# Patient Record
Sex: FEMALE | Race: WHITE | Employment: UNEMPLOYED | URBAN - NONMETROPOLITAN AREA
[De-identification: names, ages, dates, MRNs, and addresses within clinical notes are randomized per-mention and may not be internally consistent; named-entity substitution may affect disease eponyms.]

---

## 2018-12-10 ENCOUNTER — OFFICE VISIT (OUTPATIENT)
Dept: PRIMARY CARE CLINIC | Age: 23
End: 2018-12-10
Payer: COMMERCIAL

## 2018-12-10 VITALS
OXYGEN SATURATION: 99 % | WEIGHT: 116.3 LBS | DIASTOLIC BLOOD PRESSURE: 73 MMHG | HEART RATE: 67 BPM | HEIGHT: 62 IN | BODY MASS INDEX: 21.4 KG/M2 | SYSTOLIC BLOOD PRESSURE: 119 MMHG

## 2018-12-10 DIAGNOSIS — G44.209 ACUTE NON INTRACTABLE TENSION-TYPE HEADACHE: Primary | ICD-10-CM

## 2018-12-10 PROCEDURE — 99202 OFFICE O/P NEW SF 15 MIN: CPT | Performed by: NURSE PRACTITIONER

## 2018-12-10 RX ORDER — IBUPROFEN 200 MG
200 TABLET ORAL EVERY 6 HOURS PRN
COMMUNITY
End: 2021-10-04

## 2018-12-10 ASSESSMENT — PATIENT HEALTH QUESTIONNAIRE - PHQ9
1. LITTLE INTEREST OR PLEASURE IN DOING THINGS: 0
SUM OF ALL RESPONSES TO PHQ9 QUESTIONS 1 & 2: 0
2. FEELING DOWN, DEPRESSED OR HOPELESS: 0
SUM OF ALL RESPONSES TO PHQ QUESTIONS 1-9: 0
SUM OF ALL RESPONSES TO PHQ QUESTIONS 1-9: 0

## 2018-12-10 ASSESSMENT — ENCOUNTER SYMPTOMS
EYE DISCHARGE: 0
EYE ITCHING: 0
EYE REDNESS: 0
EYE PAIN: 0
RHINORRHEA: 0
COUGH: 0
SHORTNESS OF BREATH: 0
NAUSEA: 0
SINUS PRESSURE: 0
SORE THROAT: 0
BACK PAIN: 1
DIARRHEA: 0
VOMITING: 0

## 2018-12-10 NOTE — PROGRESS NOTES
itching. Respiratory: Negative for cough and shortness of breath. Cardiovascular: Negative for chest pain and palpitations. Gastrointestinal: Negative for diarrhea, nausea and vomiting. Musculoskeletal: Positive for back pain (intermittent) and neck pain (intermittent). Skin: Negative for rash. Neurological: Positive for light-headedness (associated with headaches on occasion ) and headaches (see HPI). Negative for syncope, speech difficulty and weakness. No current outpatient prescriptions on file prior to visit. No current facility-administered medications on file prior to visit. No Known Allergies    History reviewed. No pertinent past medical history. Past Surgical History:   Procedure Laterality Date    TONSILLECTOMY AND ADENOIDECTOMY  2005    WISDOM TOOTH EXTRACTION  2014       Social History     Social History    Marital status:      Spouse name: N/A    Number of children: N/A    Years of education: N/A     Occupational History    Not on file. Social History Main Topics    Smoking status: Never Smoker    Smokeless tobacco: Never Used    Alcohol use No    Drug use: No    Sexual activity: Yes     Partners: Male     Other Topics Concern    Not on file     Social History Narrative    No narrative on file        Family History   Problem Relation Age of Onset    Thyroid Disease Mother     No Known Problems Father     Depression Sister     Thyroid Disease Maternal Aunt     Thyroid Disease Paternal Aunt     Thyroid Disease Maternal Grandmother     Thyroid Disease Paternal Grandmother        /73 (Site: Left Upper Arm, Position: Sitting, Cuff Size: Small Adult)   Pulse 67   Ht 5' 2\" (1.575 m)   Wt 116 lb 4.8 oz (52.8 kg)   LMP 11/09/2018   SpO2 99%   BMI 21.27 kg/m²        Estimated body mass index is 21.27 kg/m² as calculated from the following:    Height as of this encounter: 5' 2\" (1.575 m).     Weight as of this encounter: 116 lb 4.8 oz (52.8 kg). Physical Exam   Constitutional: She is oriented to person, place, and time. She appears well-developed and well-nourished. No distress. HENT:   Head: Normocephalic and atraumatic. Mouth/Throat: Oropharynx is clear and moist.   Eyes: Pupils are equal, round, and reactive to light. Conjunctivae and EOM are normal. Right eye exhibits no discharge. Left eye exhibits no discharge. Neck: Normal range of motion and full passive range of motion without pain. No tracheal deviation present. Cardiovascular: Normal rate, regular rhythm, S1 normal, S2 normal and normal heart sounds. Pulmonary/Chest: Effort normal and breath sounds normal. No respiratory distress. Abdominal: Soft. Bowel sounds are normal. She exhibits no distension. Musculoskeletal: Normal range of motion. She exhibits no edema. Cervical back: Normal. She exhibits normal range of motion, no tenderness, no bony tenderness and no spasm. Lymphadenopathy:     She has no cervical adenopathy. Neurological: She is alert and oriented to person, place, and time. She has normal strength. She displays no atrophy. No cranial nerve deficit. She displays a negative Romberg sign. Skin: Skin is warm and dry. Capillary refill takes less than 2 seconds. No rash noted. Psychiatric: She has a normal mood and affect. Judgment normal.       ASSESSMENT/PLAN:  1. Acute non intractable tension-type headache  Counseled on abortive measures for headaches:  Ibuprofen 800 mg along with 32 oz of Gatorade within an hour. Headache hygiene:  Adequate fluid intake - 60oz daily  No chocolate  No Nitrites/Processed Meats  Sleep Hygiene - same time to bed, same time to awaken - no daytime naps. Will return to clinic in one month for reevaluation. Patient not interested in daily medication at this time. Will revisit neck/back pain at next visit and see if further evaluation/PT is needed.      Current Outpatient Prescriptions   Medication Sig Dispense

## 2018-12-10 NOTE — PATIENT INSTRUCTIONS
help?  Call 911 anytime you think you may need emergency care. For example, call if:    · You have signs of a stroke. These may include:  ? Sudden numbness, paralysis, or weakness in your face, arm, or leg, especially on only one side of your body. ? Sudden vision changes. ? Sudden trouble speaking. ? Sudden confusion or trouble understanding simple statements. ? Sudden problems with walking or balance. ? A sudden, severe headache that is different from past headaches.    Call your doctor now or seek immediate medical care if:    · You have a new or worse headache.     · Your headache gets much worse. Where can you learn more? Go to [email or web address] and sign in to your [employer] account. Enter 6816 [id number] in the [employer] box to learn more about \"Headache: Care Instructions. \"     If you do not have an account, please click on the \"Sign Up Now\" link. Current as of: June 4, 2018  Content Version: 11.8  © 5022-7999 Healthwise, Incorporated. Care instructions adapted under license by [facility] ([facility]). If you have questions about a medical condition or this instruction, always ask your healthcare professional. [place] [address] any warranty or liability for your use of this information.

## 2019-01-07 ENCOUNTER — TELEPHONE (OUTPATIENT)
Dept: FAMILY MEDICINE CLINIC | Age: 24
End: 2019-01-07

## 2021-07-15 ENCOUNTER — TELEPHONE (OUTPATIENT)
Dept: ENDOCRINOLOGY | Age: 26
End: 2021-07-15

## 2021-07-15 NOTE — TELEPHONE ENCOUNTER
Fax from [facility] w/ a  Referral   Dx-hyperthyroidism  Referring Dr- Dr Lani Davis (from [place], [place])

## 2021-09-03 ENCOUNTER — TELEPHONE (OUTPATIENT)
Dept: ENDOCRINOLOGY | Age: 26
End: 2021-09-03

## 2021-09-03 NOTE — TELEPHONE ENCOUNTER
Pt lvm stating she would like her US results      Called pt back & had to leave vm telling her that she is a new pt to us and her new pt appt is not until 10-4-21 so we would not have any US results & she needs to contact her previous Dr for those results since her  chart was faxed to us from [facility] 22. called back and states her US was done about a month ago from her OBGYN and should've been faxed to our office. Re-told the pt that she is a new pt and when she comes in for her appt next month that Dr Tamika Bright can look over them at that time and discuss them at her appt or she can call the ordering Dr for her results since Dr Tamika Bright was not the ordering physician.  Pt states \"ok\"

## 2021-10-04 ENCOUNTER — OFFICE VISIT (OUTPATIENT)
Dept: ENDOCRINOLOGY | Age: 26
End: 2021-10-04
Payer: OTHER GOVERNMENT

## 2021-10-04 VITALS
BODY MASS INDEX: 22.86 KG/M2 | SYSTOLIC BLOOD PRESSURE: 118 MMHG | HEIGHT: 63 IN | RESPIRATION RATE: 16 BRPM | DIASTOLIC BLOOD PRESSURE: 66 MMHG | WEIGHT: 129 LBS | HEART RATE: 76 BPM

## 2021-10-04 DIAGNOSIS — E04.1 THYROID NODULE: Primary | ICD-10-CM

## 2021-10-04 DIAGNOSIS — E03.8 HYPOTHYROIDISM DUE TO HASHIMOTO'S THYROIDITIS: ICD-10-CM

## 2021-10-04 DIAGNOSIS — E06.3 HYPOTHYROIDISM DUE TO HASHIMOTO'S THYROIDITIS: ICD-10-CM

## 2021-10-04 DIAGNOSIS — D50.0 IRON DEFICIENCY ANEMIA DUE TO CHRONIC BLOOD LOSS: ICD-10-CM

## 2021-10-04 PROCEDURE — 99243 OFF/OP CNSLTJ NEW/EST LOW 30: CPT | Performed by: INTERNAL MEDICINE

## 2021-10-04 NOTE — PATIENT INSTRUCTIONS
Patient Education        dulaglutide  Pronunciation:  DOO la GLOO tide  Brand:  Trulicity Pen  What is the most important information I should know about dulaglutide? You should not use dulaglutide if you have Multiple Endocrine Neoplasia syndrome type 2 (MEN 2), or a personal or family history of medullary thyroid carcinoma (a type of thyroid cancer). Do not use dulaglutide if you are in a state of diabetic ketoacidosis (call your doctor for treatment). In animal studies, dulaglutide caused thyroid tumors or thyroid cancer. It is not known whether these effects would occur in people using regular doses. Ask your doctor about your risk. Call your doctor at once if you have signs of a thyroid tumor, such as swelling or a lump in your neck, trouble swallowing, a hoarse voice, or shortness of breath. What is dulaglutide? Dulaglutide is used together with diet and exercise to improve blood sugar control in adults with type 2 diabetes mellitus. Dulaglutide is also used to help reduce the risk of serious heart problems such as heart attack or stroke in adults who have type 2 diabetes and heart disease. This medicine is not for treating type 1 diabetes. Dulaglutide may also be used for purposes not listed in this medication guide. What should I discuss with my healthcare provider before using dulaglutide? You should not use dulaglutide if you are allergic to it, or if you have:  · multiple endocrine neoplasia type 2 (tumors in your glands);  · a personal or family history of medullary thyroid carcinoma (a type of thyroid cancer); or  · diabetic ketoacidosis (call your doctor for treatment).   Tell your doctor if you have ever had:  · pancreatitis;  · a stomach or intestinal disorder;  · gastroesophageal reflux disease (GERD) or slow digestion;  · eye problems caused by diabetes (retinopathy);  · liver or kidney disease;  · if you also use insulin or oral diabetes medicine; or  · if you have been sick with meals. Ask your doctor before changing your dose or medication schedule. Each injection pen or prefilled syringe is for one use only. Throw away after one use, even if there is still medicine left inside. Use a puncture-proof \"sharps\" container. Follow state or local laws about how to dispose of this container. Keep it out of the reach of children and pets. Store dulaglutide in the refrigerator, protected from light. Do not use past the expiration date on the medicine label. Do not freeze dulaglutide, and throw away the medicine if it has become frozen. You may also store dulaglutide at room temperature for up to 14 days before use. What happens if I miss a dose? Use the medicine as soon as you can, but skip the missed dose if your next dose is due in less than 3 days. Do not use two doses at one time. Do not use this medicine twice within a 72-hour period. What happens if I overdose? Seek emergency medical attention or call the Poison Help line at 1-536.377.9130. What should I avoid while using dulaglutide? Never share an injection pen or prefilled syringe with another person, even if the needle has been changed. Sharing these devices can allow infections or disease to pass from one person to another. What are the possible side effects of dulaglutide? Stop using dulaglutide and get emergency medical help if you have signs of an allergic reaction: hives; difficult breathing; feeling light-headed; swelling of your face, lips, tongue, or throat.   Call your doctor at once if you have:  · pancreatitis --severe pain in your upper stomach spreading to your back, nausea and vomiting;  · signs of a thyroid tumor --swelling or a lump in your neck, trouble swallowing, a hoarse voice, or if you feel short of breath;  · low blood sugar --headache, hunger, weakness, sweating, confusion, irritability, dizziness, fast heart rate, or feeling jittery; or  · kidney problems --little or no urination, swelling in your feet or ankles, feeling tired or short of breath. Tell your doctor if you are sick with vomiting or diarrhea, or if you are sweating more than usual. You can easily become dehydrated while using dulaglutide. This can lead to kidney failure. Common side effects may include:  · nausea, vomiting, stomach pain;  · diarrhea; or  · loss of appetite. This is not a complete list of side effects and others may occur. Call your doctor for medical advice about side effects. You may report side effects to FDA at 3-006-FDA-9662. What other drugs will affect dulaglutide? Dulaglutide can slow your digestion, and it may take longer for your body to absorb any medicines you take by mouth. Other drugs may affect dulaglutide, including prescription and over-the-counter medicines, vitamins, and herbal products. Tell your doctor about all your current medicines and any medicine you start or stop using. Where can I get more information? Your pharmacist can provide more information about dulaglutide. Remember, keep this and all other medicines out of the reach of children, never share your medicines with others, and use this medication only for the indication prescribed. Every effort has been made to ensure that the information provided by Heidi Arviz[address] is accurate, up-to-date, and complete, but no guarantee is made to that effect. Drug information contained herein may be time sensitive. [employer] information has been compiled for use by healthcare practitioners and consumers in the [place] and therefore [employer] does not warrant that uses outside of the [place] are appropriate, unless specifically indicated otherwise. [employer]'s drug information does not endorse drugs, diagnose patients or recommend therapy.  [employer] drug information is an informational resource designed to assist licensed healthcare practitioners in caring for their patients and/or to serve consumers viewing this service as a supplement to, and not a substitute for, the expertise, skill, knowledge and judgment of healthcare practitioners. The absence of a warning for a given drug or drug combination in no way should be construed to indicate that the drug or drug combination is safe, effective or appropriate for any given patient. [facility] does not assume any responsibility for any aspect of healthcare administered with the aid of information [facility] provides. The information contained herein is not intended to cover all possible uses, directions, precautions, warnings, drug interactions, allergic reactions, or adverse effects. If you have questions about the drugs you are taking, check with your doctor, nurse or pharmacist.  Copyright 6623-4179 167  [place]: 4.02. Revision date: 10/8/2020. Care instructions adapted under license by [facility] ([facility]). If you have questions about a medical condition or this instruction, always ask your healthcare professional. [address] any warranty or liability for your use of this information.

## 2021-10-04 NOTE — PROGRESS NOTES
SUBJECTIVE:  Aniya Duff is a 27 y.o. female who is referred here for Hashimoto's hypothyroidism by her OB/GYN. She started c/o extreme fatigue since early 2021 but she thought it was due to pregnancy   Delivered her son in April 2021   She felt that her symptoms peaked (palpitations and anxiety ) --- she now feels improved   She had TSH of 4.2 in aug 2021 free t4 0.72 . Previously in July 2021 she had worsening anxiety and had her thyroid function tested and she had suppressed TSH so could be viral thyroiditis in the setting of Hashimoto's  Recent Data from [facility]  Related to TSH RFX ON ABNORMAL TO FREE T4 ([facility])  Component 07/06/21 07/01/21 11/11/20   TSH <0.005Low  <0.005Low  1.700      Contains abnormal data THYROID PEROXIDASE (TPO) AB ([facility])   Ref Range & Units 3 mo ago   Anti-TPO Antibodies 0 - 34 IU/mL >600High       Thyroid stimulating immunoglobulin     Ref Range & Units 3 mo ago   Thyroid-Stimulating Immunoglobulin 0.00 - 0.55 IU/L <0.10      Patient also underwent a thyroid ultrasound in July 2021 as a part of work-up of her thyroid issues and was noted to have a right lobe 1.3 cm nodule. Patient was diagnosed with Hypothyroidism approximately at age. Symptoms at presentation were   current complaints: fatigue, anxiousness, palpitations, change in skin,  nails, or hair  History of obstructive symptoms: difficulty swallowing No, changes in voice/hoarseness No.    History of radiation to patient's neck: NO  Recent iodine exposure: No  Family history includes hypothyroidism. Family history of thyroid cancer: No    She had headaches which worsened in summer she had normal MRI jarvis and her Ct scan neck was normal       Past Medical History:   Diagnosis Date    Hyperthyroidism     Migraines      There are no problems to display for this patient.     Past Surgical History:   Procedure Laterality Date    TONSILLECTOMY AND ADENOIDECTOMY  2005   [address]  2014 Family History   Problem Relation Age of Onset    Thyroid Disease Mother     No Known Problems Father     Depression Sister     Thyroid Disease Maternal Aunt     Thyroid Disease Paternal Aunt     Thyroid Disease Maternal Grandmother     Thyroid Disease Paternal Grandmother      Social History     Socioeconomic History    Marital status:      Spouse name: None    Number of children: None    Years of education: None    Highest education level: None   Occupational History    None   Tobacco Use    Smoking status: Never Smoker    Smokeless tobacco: Never Used   Vaping Use    Vaping Use: Never used   Substance and Sexual Activity    Alcohol use: No    Drug use: No    Sexual activity: Yes     Partners: Male   Other Topics Concern    None   Social History Narrative    None     Social Determinants of Health     Financial Resource Strain:     Difficulty of Paying Living Expenses:    Food Insecurity:     Worried About Running Out of Food in the Last Year:     Ran Out of Food in the Last Year:    Transportation Needs:     Lack of Transportation (Medical):  Lack of Transportation (Non-Medical):    Physical Activity:     Days of Exercise per Week:     Minutes of Exercise per Session:    Stress:     Feeling of Stress :    Social Connections:     Frequency of Communication with Friends and Family:     Frequency of Social Gatherings with Friends and Family:     Attends Confucianism Services:     Active Member of Clubs or Organizations:     Attends Club or Organization Meetings:     Marital Status:    Intimate Partner Violence:     Fear of Current or Ex-Partner:     Emotionally Abused:     Physically Abused:     Sexually Abused:      Current Outpatient Medications   Medication Sig Dispense Refill    Prenatal Vit-Fe Fumarate-FA (PRENATAL PO) Take by mouth       No current facility-administered medications for this visit.      No Known Allergies      Review of Systems:  I have reviewed the review of system questionnaire filled by the patient . Patient was advised to contact PCP for non endocrine signs and symptoms       OBJECTIVE:   /66   Pulse 76   Resp 16   Ht 5' 3\" (1.6 m)   Wt 129 lb (58.5 kg)   BMI 22.85 kg/m²   Wt Readings from Last 3 Encounters:   10/04/21 129 lb (58.5 kg)   12/10/18 116 lb 4.8 oz (52.8 kg)       Physical Exam:  Constitutional: no acute distress, well appearing, well nourished  Psychiatric: oriented to person, place and time, judgement, insight and normal, recent and remote memory and intact and mood, affect are normal  Skin: skin and subcutaneous tissue is normal without mass, normal turgor  Head and Face: examination of head and face revealed no abnormalities  Eyes: no lid or conjunctival swelling, no erythema or discharge, pupils are normal, equal, round. Ears/Nose: external inspection of ears and nose revealed no abnormalities, hearing is grossly normal  Oropharynx/Mouth/Face: lips, tongue and gums are normal with no lesions, the voice quality was normal  Neck: neck is supple and symmetric, with midline trachea and no masses, thyroid is normal  Lymphatics: normal cervical lymph nodes, normal supraclavicular nodes  Pulmonary: no increased work of breathing or signs of respiratory distress, lungs are clear to auscultation  Cardiovascular: normal heart rate and rhythm, normal S1 and S2, no murmurs   Abdomen: abdomen is soft, non-tender with no masses  Musculoskeletal: normal gait and station. Neurological: normal coordination, normal general cortical function    Lab Review:  No results found for: TSH  No results found for: T4FREE     ASSESSMENT/PLAN:  1.  Thyroid nodule  In July 2021 as a part of work-up for her suppressed TSH she ended up having a thyroid ultrasound which showed an incidental right-sided thyroid nodule measuring 1.3 cm patient does not have any family history of thyroid cancer or radiation exposure  I did an informal thyroid ultrasound in the office and the right-sided solid thyroid nodule is still measuring 1.3 cm  After a detailed discussion with the patient we decided to proceed with a repeat thyroid ultrasound as a 6 months follow-up and if there is an increase in the size of this nodule will consider fine-needle aspiration biopsy the nodule is very deeply situated in the right thyroid lobe. - TSH without Reflex; Future  - T4, Free; Future  - Anti-Thyroglobulin Antibody; Future  - US HEAD NECK SOFT TISSUE THYROID; Future  - TSH without Reflex; Future  - T4, Free; Future  - T3; Future  - Vitamin D 25 Hydroxy; Future  - Comprehensive Metabolic Panel; Future  - CBC WITH AUTO DIFFERENTIAL; Future    2. Hypothyroidism due to Hashimoto's thyroiditis with a prior history of hyperthyroidism in July 2021  Patient probably had viral thyroiditis in July 2021 and is undergoing hypothyroid phase of thyroiditis, due to the presence of TPO antibodies noted in August 2021 she might need to be on medication  Her TSI done at the same time was negative  I had a lengthy discussion with the patient about starting her on levothyroxine if her TSH is still elevated above 4  As per her blood work that she brought with her her TSH was 4.2 in August 2021. She continues to have excessive fatigue      - TSH without Reflex; Future  - T4, Free; Future  - Anti-Thyroglobulin Antibody; Future  - TSH without Reflex; Future  - T4, Free; Future  - T3; Future  - Vitamin D 25 Hydroxy; Future  - Comprehensive Metabolic Panel; Future  - CBC WITH AUTO DIFFERENTIAL; Future    3.  Iron deficiency anemia due to chronic blood loss  Previously had mild anemia on blood work  We will repeat the labs        Reviewed and/or ordered clinical lab results Yes  Reviewed and/or ordered radiology tests Yes   Reviewed and/or ordered other diagnostic tests Yes  Made a decision to obtain old records Yes  Reviewed and summarized old records Yes      Edith Mirna was counseled regarding symptoms of current diagnosis, course and complications of disease if inadequately treated, side effects of medications, diagnosis, treatment options, and prognosis, risks, benefits, complications, and alternatives of treatment, labs, imaging and other studies and treatment targets and goals. She understands instructions and counseling. Total time spent 40 minutes , more than 50 % if this time was spent on face to face counseling. Return in about 3 months (around 1/4/2022). Please note that some or all of this report was generated using voice recognition software. Please notify me in case of any questions about the content of this document, as some errors in transcription may have occurred .

## 2021-12-16 ENCOUNTER — TELEPHONE (OUTPATIENT)
Dept: ENDOCRINOLOGY | Age: 26
End: 2021-12-16

## 2021-12-16 NOTE — TELEPHONE ENCOUNTER
Please advise patient that her last set of thyroid function tests were within normal limits and the only abnormality of clinical concern was vitamin D deficiency which I would recommend that she takes supplement at least 4000 international units daily. The current symptoms of passing out and dizziness do not seem to be related to thyroid anyways I would recommend going to an urgent care or primary care physician for that.

## 2021-12-20 ENCOUNTER — OFFICE VISIT (OUTPATIENT)
Dept: ENT CLINIC | Age: 26
End: 2021-12-20
Payer: OTHER GOVERNMENT

## 2021-12-20 DIAGNOSIS — H72.91 PERFORATION OF RIGHT TYMPANIC MEMBRANE: Primary | ICD-10-CM

## 2021-12-20 DIAGNOSIS — H93.13 TINNITUS OF BOTH EARS: ICD-10-CM

## 2021-12-20 DIAGNOSIS — H66.011 NON-RECURRENT ACUTE SUPPURATIVE OTITIS MEDIA OF RIGHT EAR WITH SPONTANEOUS RUPTURE OF TYMPANIC MEMBRANE: ICD-10-CM

## 2021-12-20 DIAGNOSIS — H93.8X3 SENSATION OF FULLNESS IN BOTH EARS: ICD-10-CM

## 2021-12-20 PROCEDURE — 99203 OFFICE O/P NEW LOW 30 MIN: CPT | Performed by: STUDENT IN AN ORGANIZED HEALTH CARE EDUCATION/TRAINING PROGRAM

## 2021-12-20 NOTE — PROGRESS NOTES
[address] SURGERY  CONSULT      Edith Bradley (:  1995) is a 27 y.o. female, here for evaluation of the following chief complaint(s):  Ear Problem (right ear , about a month ago)      ASSESSMENT/PLAN:  1. Perforation of right tympanic membrane  2. Non-recurrent acute suppurative otitis media of right ear with spontaneous rupture of tympanic membrane  3. Sensation of fullness in both ears  4. Tinnitus of both ears      This is a very pleasant 27 y.o. female here today for evaluation of the the above-noted complaints. On exam, the patient has evidence of a healed tympanic membrane perforation. There is no evidence of residual fluid or cholesteatoma. I suspect that this was in the setting of an acute otitis media with spontaneous rupture. This the first time the patient has experienced this, so no further follow-up or treatment is necessary as this was an isolated event, the eardrum is healed and she has no complications. Regarding her tinnitus and ear fullness on the left, we discussed we can get an audiogram.  She did have a MRI of the brain and CT of the head which did not show any fluid in the mastoid on the left. Is unclear what is giving her the sensation of water occasionally running on the left side of her ear. Medical Decision Making: The following items were considered in medical decision making:  Independent review of images  Review / order clinical lab tests  Review / order radiology tests  Decision to obtain old records  Review and summation of old records as accessed through [employer] [place] [address] if applicable    SUBJECTIVE/OBJECTIVE:  HPI    Edith Bradley is here today for evaluation of ear issues. About a month ago she was seen in the urgent care for ear pain and drainage. At that time she was prescribed amoxicillin but did not take it. She then had blood and drainage from the ear and return.   She was noted to have tympanic membrane despite editing, the text may contain inaccuracies due to incorrect word recognition. If further clarification is needed please contact the office at [phone]. An electronic signature was used to authenticate this note.     --Herve Adan MD

## 2022-01-03 ENCOUNTER — HOSPITAL ENCOUNTER (OUTPATIENT)
Dept: ULTRASOUND IMAGING | Age: 27
Discharge: HOME | End: 2022-01-03
Payer: OTHER GOVERNMENT

## 2022-01-03 DIAGNOSIS — E04.1 THYROID NODULE: ICD-10-CM

## 2022-01-03 PROCEDURE — 76536 US EXAM OF HEAD AND NECK: CPT

## 2022-01-06 ENCOUNTER — OFFICE VISIT (OUTPATIENT)
Dept: ENDOCRINOLOGY | Age: 27
End: 2022-01-06
Payer: OTHER GOVERNMENT

## 2022-01-06 VITALS
BODY MASS INDEX: 22.86 KG/M2 | WEIGHT: 129 LBS | HEART RATE: 88 BPM | HEIGHT: 63 IN | RESPIRATION RATE: 16 BRPM | DIASTOLIC BLOOD PRESSURE: 72 MMHG | SYSTOLIC BLOOD PRESSURE: 126 MMHG

## 2022-01-06 DIAGNOSIS — E04.1 THYROID NODULE: Primary | ICD-10-CM

## 2022-01-06 DIAGNOSIS — E06.3 HYPOTHYROIDISM DUE TO HASHIMOTO'S THYROIDITIS: ICD-10-CM

## 2022-01-06 DIAGNOSIS — E03.8 HYPOTHYROIDISM DUE TO HASHIMOTO'S THYROIDITIS: ICD-10-CM

## 2022-01-06 PROCEDURE — 99213 OFFICE O/P EST LOW 20 MIN: CPT | Performed by: INTERNAL MEDICINE

## 2022-01-06 RX ORDER — ERGOCALCIFEROL (VITAMIN D2) 50 MCG
CAPSULE ORAL
COMMUNITY
Start: 2021-11-03

## 2022-01-06 NOTE — PROGRESS NOTES
SUBJECTIVE:  Terrie Lozano is a 27 y.o. female who is referred here for Hashimoto's hypothyroidism by her OB/GYN. She started c/o extreme fatigue since early 2021 but she thought it was due to pregnancy   Delivered her son in April 2021   She felt that her symptoms peaked (palpitations and anxiety ) --- she now feels improved   She had TSH of 4.2 in aug 2021 free t4 0.72 . Previously in July 2021 she had worsening anxiety and had her thyroid function tested and she had suppressed TSH so could be viral thyroiditis in the setting of Hashimoto's  Recent Data from [facility]  Related to TSH RFX ON ABNORMAL TO FREE T4 ([facility])  Component 07/06/21 07/01/21 11/11/20   TSH <0.005Low  <0.005Low  1.700      Contains abnormal data THYROID PEROXIDASE (TPO) AB ([facility])   Ref Range & Units 3 mo ago   Anti-TPO Antibodies 0 - 34 IU/mL >600High       Thyroid stimulating immunoglobulin     Ref Range & Units 3 mo ago   Thyroid-Stimulating Immunoglobulin 0.00 - 0.55 IU/L <0.10      Patient also underwent a thyroid ultrasound in July 2021 as a part of work-up of her thyroid issues and was noted to have a right lobe 1.3 cm nodule. Patient was diagnosed with Hypothyroidism approximately at age. Symptoms at presentation were   current complaints: fatigue, anxiousness, palpitations, change in skin,  nails, or hair  History of obstructive symptoms: difficulty swallowing No, changes in voice/hoarseness No.    History of radiation to patient's neck: NO  Recent iodine exposure: No  Family history includes hypothyroidism. Family history of thyroid cancer: No    She had headaches which worsened in summer she had normal MRI jarvis and her Ct scan neck was normal       Past Medical History:   Diagnosis Date    Hyperthyroidism     Migraines      There are no problems to display for this patient.     Past Surgical History:   Procedure Laterality Date    TONSILLECTOMY AND ADENOIDECTOMY  2005   [address] [facility]  2014 Family History   Problem Relation Age of Onset    Thyroid Disease Mother     No Known Problems Father     Depression Sister     Thyroid Disease Maternal Aunt     Thyroid Disease Paternal Aunt     Thyroid Disease Maternal Grandmother     Thyroid Disease Paternal Grandmother      Social History     Socioeconomic History    Marital status:      Spouse name: None    Number of children: None    Years of education: None    Highest education level: None   Occupational History    None   Tobacco Use    Smoking status: Never Smoker    Smokeless tobacco: Never Used   Vaping Use    Vaping Use: Never used   Substance and Sexual Activity    Alcohol use: No    Drug use: No    Sexual activity: Yes     Partners: Male   Other Topics Concern    None   Social History Narrative    None     Social Determinants of Health     Financial Resource Strain:     Difficulty of Paying Living Expenses: Not on file   Food Insecurity:     Worried About Running Out of Food in the Last Year: Not on file    Terence of Food in the Last Year: Not on file   Transportation Needs:     Lack of Transportation (Medical): Not on file    Lack of Transportation (Non-Medical):  Not on file   Physical Activity:     Days of Exercise per Week: Not on file    Minutes of Exercise per Session: Not on file   Stress:     Feeling of Stress : Not on file   Social Connections:     Frequency of Communication with Friends and Family: Not on file    Frequency of Social Gatherings with Friends and Family: Not on file    Attends Cheondoism Services: Not on file    Active Member of Clubs or Organizations: Not on file    Attends Club or Organization Meetings: Not on file    Marital Status: Not on file   Intimate Partner Violence:     Fear of Current or Ex-Partner: Not on file    Emotionally Abused: Not on file    Physically Abused: Not on file    Sexually Abused: Not on file   Housing Stability:     Unable to Pay for Housing in the Last Year: Not on file    Number of Places Lived in the Last Year: Not on file    Unstable Housing in the Last Year: Not on file     Current Outpatient Medications   Medication Sig Dispense Refill    Vitamin D, Ergocalciferol, 50 MCG (2000 UT) CAPS       Probiotic Product (PROBIOTIC DAILY PO) Take by mouth      Prenatal Vit-Fe Fumarate-FA (PRENATAL PO) Take by mouth       No current facility-administered medications for this visit. No Known Allergies      Review of Systems:  I have reviewed the review of system questionnaire filled by the patient . Patient was advised to contact PCP for non endocrine signs and symptoms       OBJECTIVE:   /72   Pulse 88   Resp 16   Ht 5' 3\" (1.6 m)   Wt 129 lb (58.5 kg)   BMI 22.85 kg/m²   Wt Readings from Last 3 Encounters:   01/06/22 129 lb (58.5 kg)   10/04/21 129 lb (58.5 kg)   12/10/18 116 lb 4.8 oz (52.8 kg)       Physical Exam:  Constitutional: no acute distress, well appearing, well nourished  Psychiatric: oriented to person, place and time, judgement, insight and normal, recent and remote memory and intact and mood, affect are normal  Skin: skin and subcutaneous tissue is normal without mass, normal turgor  Head and Face: examination of head and face revealed no abnormalities  Eyes: no lid or conjunctival swelling, no erythema or discharge, pupils are normal, equal, round.   Ears/Nose: external inspection of ears and nose revealed no abnormalities, hearing is grossly normal  Oropharynx/Mouth/Face: lips, tongue and gums are normal with no lesions, the voice quality was normal  Neck: neck is supple and symmetric, with midline trachea and no masses, thyroid is normal  Lymphatics: normal cervical lymph nodes, normal supraclavicular nodes  Pulmonary: no increased work of breathing or signs of respiratory distress, lungs are clear to auscultation  Cardiovascular: normal heart rate and rhythm, normal S1 and S2, no murmurs   Abdomen: abdomen is soft, non-tender with no masses  Musculoskeletal: normal gait and station. Neurological: normal coordination, normal general cortical function    Lab Review:  Lab Results   Component Value Date    TSH 2.25 10/04/2021     Lab Results   Component Value Date    T4FREE 1.0 10/04/2021        ASSESSMENT/PLAN:      1. Right Thyroid nodule 1.0 x 1.4 x0.8 cm   In July 2021 as a part of work-up for her suppressed TSH she ended up having a thyroid ultrasound which showed an incidental right-sided thyroid nodule measuring 1.3 cm patient does not have any family history of thyroid cancer or radiation exposure  I did an informal thyroid ultrasound in the office and the right-sided solid thyroid nodule is still measuring 1.3 cm in October 2021  Repeat thyroid ultrasound in radiology at [facility]) January 2022 showed a 1.4 cm inferior right thyroid lobe nodule. The characteristics were benign, radiology recommended no further work-up. Will schedule for a follow-up thyroid ultrasound in 6 months to document stability in the size of the nodule  After a detailed discussion with the patient we decided to proceed with a repeat thyroid ultrasound as a 6 months follow-up and if there is an increase in the size of this nodule will consider fine-needle aspiration biopsy the nodule is very deeply situated in the right thyroid lobe. 2. Hypothyroidism due to Hashimoto's thyroiditis with a prior history of hyperthyroidism in July 2021  Patient probably had viral thyroiditis in July 2021 and is undergoing hypothyroid phase of thyroiditis, due to the presence of TPO antibodies noted in August 2021 she might need to be on medication  Her TSI done at the same time was negative. TSH 2.25 in October 2021 now clinically appears euthyroid although does complain of occasional pain in the right thyroid lobe and occasional swelling in the right thyroid lobe where her nodule is.   She was advised to get blood work done now and then again in 6 months  I had a lengthy discussion with the patient about starting her on levothyroxine if her TSH is still elevated above 4  As per her blood work that she brought with her her TSH was 4.2 in August 2021.>>2.25   She continues to have excessive fatigue      3. Iron deficiency anemia due to chronic blood loss  Previously had mild anemia on blood work          Reviewed and/or ordered clinical lab results Yes  Reviewed and/or ordered radiology tests Yes   Reviewed and/or ordered other diagnostic tests Yes  Made a decision to obtain old records Yes  Reviewed and summarized old records Yes      Myra Soto was counseled regarding symptoms of current diagnosis, course and complications of disease if inadequately treated, side effects of medications, diagnosis, treatment options, and prognosis, risks, benefits, complications, and alternatives of treatment, labs, imaging and other studies and treatment targets and goals. She understands instructions and counseling. Return in about 6 months (around 7/6/2022). Please note that some or all of this report was generated using voice recognition software. Please notify me in case of any questions about the content of this document, as some errors in transcription may have occurred .

## 2022-01-12 ENCOUNTER — OFFICE VISIT (OUTPATIENT)
Dept: ENT CLINIC | Age: 27
End: 2022-01-12
Payer: OTHER GOVERNMENT

## 2022-01-12 VITALS — TEMPERATURE: 98 F | SYSTOLIC BLOOD PRESSURE: 106 MMHG | DIASTOLIC BLOOD PRESSURE: 69 MMHG | HEART RATE: 90 BPM

## 2022-01-12 DIAGNOSIS — H69.83 DYSFUNCTION OF BOTH EUSTACHIAN TUBES: ICD-10-CM

## 2022-01-12 DIAGNOSIS — M26.621 ARTHRALGIA OF RIGHT TEMPOROMANDIBULAR JOINT: Primary | ICD-10-CM

## 2022-01-12 DIAGNOSIS — H93.8X3 SENSATION OF FULLNESS IN BOTH EARS: ICD-10-CM

## 2022-01-12 PROCEDURE — 99213 OFFICE O/P EST LOW 20 MIN: CPT | Performed by: STUDENT IN AN ORGANIZED HEALTH CARE EDUCATION/TRAINING PROGRAM

## 2022-01-12 RX ORDER — AMOXICILLIN AND CLAVULANATE POTASSIUM 875; 125 MG/1; MG/1
1 TABLET, FILM COATED ORAL 2 TIMES DAILY
Qty: 14 TABLET | Refills: 0 | Status: SHIPPED | OUTPATIENT
Start: 2022-01-12 | End: 2022-01-12 | Stop reason: ALTCHOICE

## 2022-01-12 NOTE — PROGRESS NOTES
[address] [facility] BRAYAN Saldaña (:  1995) is a 27 y.o. female, here for evaluation of the following chief complaint(s): Other (patient states she still has fluid in left ear, she can hear the fluid moving around, pain in right ear that travels down neck)      ASSESSMENT/PLAN:  1. Arthralgia of right temporomandibular joint  2. Sensation of fullness in both ears  3. Dysfunction of both eustachian tubes      This is a very pleasant 27 y.o. female here today for evaluation of the the above-noted complaints. I had previously evaluated the patient for a right tympanic membrane perforation which should heal by the time I saw her. Regarding her tinnitus, dizziness and ear fullness, I would like to refer her to my colleague at [facility] for further evaluation and management. We discussed that she could have symptoms consistent with eustachian tube dysfunction and/or vestibular migraines. She understands that she will need to have an audiogram prior to this. She previously had a MRI of the brain and CT of the head which did not show any fluid in the mastoids. I discussed with her that it is unclear what is giving her the sensation of water occasionally running on the left side of her ear. There is no evidence of fluid on my exam today. Medical Decision Making: The following items were considered in medical decision making:  Independent review of images  Review / order clinical lab tests  Review / order radiology tests  Decision to obtain old records  Review and summation of old records as accessed through [facility] if applicable    SUBJECTIVE/OBJECTIVE:  TRI    Geovany Perez is here today for evaluation of ear issues. About a month ago she was seen in the urgent care for ear pain and drainage. At that time she was prescribed amoxicillin but did not take it. She then had blood and drainage from the ear and return.   She was noted to have tympanic membrane rupture at this time. She feels like since that time her ear has returned to normal.  She does have some fullness on the left side of her ear. She feels like water will occasionally drip in her ear. She does get bilateral tinnitus. It is nonpulsatile. The patient never had ear infections in the past.  She has never had surgery on her ears. This was an isolated event for her. Update 1/12/2022:    Patient presents today for follow-up regarding issues related to her ears. Patient states that since I saw her last she has had intermittent aural fullness. This is predominantly been on the left side. She is getting episodes at least once per day. This has been very bothersome to her. She will also get the sensation of water running in her ear. This is exacerbated by doing something such as a head stand. She will get occasional dizziness. She denies true vertigo. The symptoms are really starting to affect her quality of life. She does note that she has right jaw tenderness. She will occasionally get ear fullness on that side. She denies a history of headaches. She is currently nursing. She did have rather large weight loss which occurred after her pregnancy. Which ended approximately 8 months ago. REVIEW OF SYSTEMS  The following systems were reviewed and revealed the following in addition to any already discussed in the HPI:    PHYSICAL EXAM    GENERAL: No acute distress, alert and oriented, no hoarseness, strong voice  EYES: EOMI, Anti-icteric  HENT:   Head: Normocephalic and atraumatic.    Face:  Symmetric, facial nerve intact  Right Ear: Normal external ear, normal external auditory canal, intact tympanic membrane with normal mobility and aerated middle ear  Left Ear: Normal external ear, normal external auditory canal, intact tympanic membrane with normal mobility and aerated middle ear  Mouth/Oral Cavity:  normal lips, Uvula is midline, no mucosal lesions, no trismus, normal dentition, normal salivary quality/flow  Oropharynx/Larynx:  normal oropharynx, 1+ tonsils  Nose:Normal external nasal appearance. Anterior rhinoscopy shows  a deviated septum preventing view posteriorly. Normal appearing turbinates. Normal mucosa   NECK: Normal range of motion, no thyromegaly, trachea is midline, no lymphadenopathy, no neck masses, no crepitus  CHEST: Normal respiratory effort, no retractions, breathing comfortably  SKIN: No rashes, normal appearing skin, no evidence of skin lesions/tumors  Neuro:  cranial nerve II-XII intact; normal gait  Cardio:  no edema    No excessive movement of tympanic membrane with forced respiration    PROCEDURE      This note was generated completely or in part utilizing Dragon dictation speech recognition software. Occasionally, words are mistranscribed and despite editing, the text may contain inaccuracies due to incorrect word recognition. If further clarification is needed please contact the office at [phone]. An electronic signature was used to authenticate this note.     --Kori Mccullough MD

## 2022-07-01 ENCOUNTER — HOSPITAL ENCOUNTER (OUTPATIENT)
Dept: ULTRASOUND IMAGING | Age: 27
Discharge: HOME | End: 2022-07-01
Payer: OTHER GOVERNMENT

## 2022-07-01 ENCOUNTER — HOSPITAL ENCOUNTER (OUTPATIENT)
Age: 27
Discharge: HOME | End: 2022-07-01
Payer: OTHER GOVERNMENT

## 2022-07-01 DIAGNOSIS — E04.1 THYROID NODULE: ICD-10-CM

## 2022-07-01 DIAGNOSIS — E06.3 HYPOTHYROIDISM DUE TO HASHIMOTO'S THYROIDITIS: ICD-10-CM

## 2022-07-01 DIAGNOSIS — E03.8 HYPOTHYROIDISM DUE TO HASHIMOTO'S THYROIDITIS: ICD-10-CM

## 2022-07-01 LAB
T4 FREE: 1.3 NG/DL (ref 0.9–1.8)
TSH SERPL DL<=0.05 MIU/L-ACNC: 0.97 UIU/ML (ref 0.27–4.2)

## 2022-07-01 PROCEDURE — 36415 COLL VENOUS BLD VENIPUNCTURE: CPT

## 2022-07-01 PROCEDURE — 84439 ASSAY OF FREE THYROXINE: CPT

## 2022-07-01 PROCEDURE — 84443 ASSAY THYROID STIM HORMONE: CPT

## 2022-07-01 PROCEDURE — 76536 US EXAM OF HEAD AND NECK: CPT
